# Patient Record
Sex: FEMALE | Race: WHITE | NOT HISPANIC OR LATINO | Employment: UNEMPLOYED | URBAN - METROPOLITAN AREA
[De-identification: names, ages, dates, MRNs, and addresses within clinical notes are randomized per-mention and may not be internally consistent; named-entity substitution may affect disease eponyms.]

---

## 2023-05-25 LAB — URINE CULTURE: ABNORMAL

## 2023-11-01 ENCOUNTER — APPOINTMENT (OUTPATIENT)
Dept: RADIOLOGY | Facility: CLINIC | Age: 48
End: 2023-11-01
Payer: COMMERCIAL

## 2024-01-08 ENCOUNTER — APPOINTMENT (OUTPATIENT)
Dept: OBSTETRICS AND GYNECOLOGY | Facility: CLINIC | Age: 49
End: 2024-01-08
Payer: COMMERCIAL

## 2024-01-22 ENCOUNTER — OFFICE VISIT (OUTPATIENT)
Dept: OBSTETRICS AND GYNECOLOGY | Facility: CLINIC | Age: 49
End: 2024-01-22
Payer: COMMERCIAL

## 2024-01-22 ENCOUNTER — HOSPITAL ENCOUNTER (OUTPATIENT)
Dept: RADIOLOGY | Facility: CLINIC | Age: 49
Discharge: HOME | End: 2024-01-22
Payer: COMMERCIAL

## 2024-01-22 DIAGNOSIS — N39.46 MIXED STRESS AND URGE URINARY INCONTINENCE: Primary | ICD-10-CM

## 2024-01-22 DIAGNOSIS — N93.8 DUB (DYSFUNCTIONAL UTERINE BLEEDING): ICD-10-CM

## 2024-01-22 PROCEDURE — 58100 BIOPSY OF UTERUS LINING: CPT | Performed by: OBSTETRICS & GYNECOLOGY

## 2024-01-22 PROCEDURE — 88305 TISSUE EXAM BY PATHOLOGIST: CPT | Performed by: PATHOLOGY

## 2024-01-22 PROCEDURE — 88305 TISSUE EXAM BY PATHOLOGIST: CPT

## 2024-01-22 PROCEDURE — 76856 US EXAM PELVIC COMPLETE: CPT | Performed by: OBSTETRICS & GYNECOLOGY

## 2024-01-22 PROCEDURE — 99214 OFFICE O/P EST MOD 30 MIN: CPT | Performed by: OBSTETRICS & GYNECOLOGY

## 2024-01-22 RX ORDER — HYDROXYUREA 500 MG/1
CAPSULE ORAL
COMMUNITY
Start: 2021-01-25 | End: 2024-01-23 | Stop reason: ALTCHOICE

## 2024-01-22 RX ORDER — IBUPROFEN 600 MG/1
TABLET ORAL EVERY 8 HOURS
COMMUNITY
Start: 2022-09-09

## 2024-01-22 RX ORDER — LEVONORGESTREL/ETHIN.ESTRADIOL 0.1-0.02MG
TABLET ORAL EVERY 24 HOURS
COMMUNITY

## 2024-01-22 NOTE — PROGRESS NOTES
Patient here complaining of irregular bleeding with continuous OCP.  Uses continuous OCP for the last 10 years due to concerns for endometriosis.  Patient is no longer having pelvic pain.  She states that her bleeding is random throughout the month.  She also complains of urine leakage with laugh, cough, and sneezing as well as random urine leakage unprovoked.  She saw a urologist 2 years ago and was started on Detrol.  It did not help with her symptoms so she Discontinued the medication.   she is ready for definitive management of her symptoms.  Her last ultrasound was in 2021 and was normal    PE   GEN NAD  Abd soft nontender nondistended  EGBUS normal  Vagina no lesions, scant blood  Cervix no lesions  Uterus 10 weeks size nontender  Adnexa no mass, nontender      Procedure: Endometrial Biopsy    Patient Declined chaperone   Procedure indication: Abnormal uterine bleeding    Risks, benefits and alternatives were discussed with the patient. We discussed possible complications and risks. Written consent was obtained prior to the procedure and is detailed in the patient's record.    Pregnancy Test: Pregnancy test not indicated    Local anesthesia: No  Tenaculum applied: yes  Cervical dilation: No  The endometrial biopsy was completed with an adequate sample obtained .  Uterus sounded to 9cm    The patient tolerated the procedure well and had minimal bleeding noted at the conclusion of the procedure.    Sample sent to  Pathology    Pelvic US today normal  Will refer to urogyn for further evaluation  Tonja Vann  01/22/2024  11:01 AM

## 2024-01-26 LAB
LABORATORY COMMENT REPORT: NORMAL
PATH REPORT.FINAL DX SPEC: NORMAL
PATH REPORT.GROSS SPEC: NORMAL
PATH REPORT.RELEVANT HX SPEC: NORMAL
PATH REPORT.TOTAL CANCER: NORMAL

## 2024-01-30 ENCOUNTER — DOCUMENTATION (OUTPATIENT)
Dept: OBSTETRICS AND GYNECOLOGY | Facility: CLINIC | Age: 49
End: 2024-01-30
Payer: COMMERCIAL

## 2024-01-30 ENCOUNTER — HOSPITAL ENCOUNTER (OUTPATIENT)
Dept: RADIOLOGY | Facility: CLINIC | Age: 49
Discharge: HOME | End: 2024-01-30
Payer: COMMERCIAL

## 2024-01-30 VITALS — WEIGHT: 171.08 LBS | HEIGHT: 63 IN | BODY MASS INDEX: 30.31 KG/M2

## 2024-01-30 DIAGNOSIS — Z12.31 ENCOUNTER FOR SCREENING MAMMOGRAM FOR MALIGNANT NEOPLASM OF BREAST: ICD-10-CM

## 2024-01-30 PROCEDURE — 77067 SCR MAMMO BI INCL CAD: CPT

## 2024-01-30 PROCEDURE — 77063 BREAST TOMOSYNTHESIS BI: CPT | Mod: BILATERAL PROCEDURE | Performed by: STUDENT IN AN ORGANIZED HEALTH CARE EDUCATION/TRAINING PROGRAM

## 2024-01-30 PROCEDURE — 77067 SCR MAMMO BI INCL CAD: CPT | Mod: BILATERAL PROCEDURE | Performed by: STUDENT IN AN ORGANIZED HEALTH CARE EDUCATION/TRAINING PROGRAM

## 2024-01-30 NOTE — PROGRESS NOTES
Left message for patient regarding endometrial biopsy results.  As the endometritis is chronic we typically do not treat with antibiotics but patient could consider a trial of antibiotics to see if it would help with the irregular bleeding.  Also mentioned if patient was thinking of definitive management for the bleeding as well as the urine incontinence issues she would need to see a urogynecologist not just a urologist

## 2024-02-06 ENCOUNTER — APPOINTMENT (OUTPATIENT)
Dept: UROLOGY | Facility: CLINIC | Age: 49
End: 2024-02-06
Payer: COMMERCIAL

## 2024-04-29 ENCOUNTER — OFFICE VISIT (OUTPATIENT)
Dept: OBSTETRICS AND GYNECOLOGY | Facility: CLINIC | Age: 49
End: 2024-04-29
Payer: COMMERCIAL

## 2024-04-29 VITALS
HEIGHT: 63 IN | BODY MASS INDEX: 32.43 KG/M2 | HEART RATE: 76 BPM | SYSTOLIC BLOOD PRESSURE: 136 MMHG | WEIGHT: 183 LBS | DIASTOLIC BLOOD PRESSURE: 86 MMHG

## 2024-04-29 DIAGNOSIS — N39.9 URINARY DISORDER: Primary | ICD-10-CM

## 2024-04-29 DIAGNOSIS — N32.81 OVERACTIVE BLADDER: ICD-10-CM

## 2024-04-29 DIAGNOSIS — N39.46 MIXED STRESS AND URGE URINARY INCONTINENCE: ICD-10-CM

## 2024-04-29 DIAGNOSIS — M79.18 MYOFASCIAL PAIN SYNDROME: ICD-10-CM

## 2024-04-29 DIAGNOSIS — N93.8 DUB (DYSFUNCTIONAL UTERINE BLEEDING): ICD-10-CM

## 2024-04-29 LAB
POC APPEARANCE, URINE: CLEAR
POC BILIRUBIN, URINE: NEGATIVE
POC BLOOD, URINE: NEGATIVE
POC COLOR, URINE: YELLOW
POC GLUCOSE, URINE: NEGATIVE MG/DL
POC KETONES, URINE: NEGATIVE MG/DL
POC LEUKOCYTES, URINE: ABNORMAL
POC NITRITE,URINE: NEGATIVE
POC PH, URINE: 6 PH
POC PROTEIN, URINE: NEGATIVE MG/DL
POC SPECIFIC GRAVITY, URINE: >=1.03
POC UROBILINOGEN, URINE: 0.2 EU/DL

## 2024-04-29 PROCEDURE — 99214 OFFICE O/P EST MOD 30 MIN: CPT | Performed by: OBSTETRICS & GYNECOLOGY

## 2024-04-29 PROCEDURE — 81003 URINALYSIS AUTO W/O SCOPE: CPT | Mod: QW | Performed by: OBSTETRICS & GYNECOLOGY

## 2024-04-29 PROCEDURE — 51798 US URINE CAPACITY MEASURE: CPT | Performed by: OBSTETRICS & GYNECOLOGY

## 2024-04-29 PROCEDURE — 99204 OFFICE O/P NEW MOD 45 MIN: CPT | Performed by: OBSTETRICS & GYNECOLOGY

## 2024-04-29 RX ORDER — DESOGESTREL AND ETHINYL ESTRADIOL 0.15-0.03
KIT ORAL
Qty: 84 TABLET | Refills: 3 | Status: SHIPPED | OUTPATIENT
Start: 2024-04-29

## 2024-04-29 RX ORDER — SOLIFENACIN SUCCINATE 5 MG/1
5 TABLET, FILM COATED ORAL DAILY
Qty: 30 TABLET | Refills: 4 | Status: SHIPPED | OUTPATIENT
Start: 2024-04-29 | End: 2025-04-29

## 2024-04-29 ASSESSMENT — PAIN SCALES - GENERAL: PAINLEVEL: 0-NO PAIN

## 2024-04-29 NOTE — LETTER
April 30, 2024     Tonja Vann MD  [id number] [facility]  [address]  [facility] [address]    Patient: Louise Romano   YOB: 1975   Date of Visit: 4/29/2024       Dear Dr. Tonja Vann MD:    Thank you for referring Louise Romano to me for evaluation. Below are my notes for this consultation.  If you have questions, please do not hesitate to call me. I look forward to following your patient along with you.       Sincerely,     Anne Marie Khanna MD      CC: No Recipients  ______________________________________________________________________________________    Urogynecology  Provider:  Anne Marie Khanna MD  [phone]                    ASSESSMENT AND PLAN:   47 y/o vaginally parous patient with complaints of intermittent abnormal uterine bleeding, OAB, and myofascial pelvic pain.    Diagnoses:  #1 Intermittent Abnormal Uterine Bleeding  #2 OAB  #3 Myofascial pelvic pain     Plan:  Abnormal uterine bleeding  - We recommended a higher dosage of her birth control to possibly combat the abnormal uterine bleeding. We prescribed her a 30 microgram OCP.    2. OAB  - We prescribed her Solifenacin 5 mg to treat bladder symptoms.   - Discussed etiology of OAB and potential management options.  - Reviewed bladder health recommendations (fluid intake, avoiding bladder triggers).  - Discussed OAB treatment options such as lifestyle changes, PFPT, medications, PTNS, intradetrusor Botox injections, or SNM.   - We discussed that PFPT may help with bladder/pelvic floor restrengthening exercises with an overall goal to better control the bladder and improve urinary symptoms. PFPT includes attending sessions with a specialized licensed female pelvic floor physical therapist who does external with internal vaginal work to ensure she is doing the correct exercises to obtain the most benefit of physical therapy. We reviewed the importance of continuing these home exercises to receive maximum benefit from PFPT.  They also teach mind over bladder strategies/urge suppression techniques to reduce the intensity of the urge to void.   - We discussed that with starting an OAB medication the goal would be to allow the detrusor muscle around the bladder to relax and prevent the muscles from spasm/squeezing too frequently to allow the bladder to store more urine which reduces the urge, frequency, and incontinent episodes.   - We briefly discussed third-line therapies we offer for OAB including PTNS which is an in-office weekly procedure x30 minutes of percutaneous tibial nerve stimulation procedure x12 weeks which is a time commitment, intradetrusor Botox injection in which we inject Botox to the muscle the controls the bladder to allow it to relax to provider OAB sx relief for up to 3-12 months but there is a possibility of urinary retention in which she would need to be amenable to learning how to CIC until the retention resolves over time as the effects of Botox wears off, and sacral neuromodulation where we do a PNE trial placing a temporary lead into the S3 sacral nerve that goes to the bowel/bladder in office and the patient evaluates after 5-7 days if they get 50% improvement in her urinary symptoms we would proceed with stage 2 of permanently placing the SNM device in the OR.   - Patient is amenable to pursuing medicinal treatment along with PFPT before attempting more invasive options.    3. Myofascial pelvic pain  - PFPT requisition sent today and gave her the list of local physical therapists with their corresponding locations/contact information.    Follow-up with Kelly Walsh in 2 months to check how she is doing with the medication. She will follow-up with Dr. Khanna in 4-5 months.     Scribe Attestation  By signing my name below, IJarrod Scribe, attest that this documentation has been prepared under the direction and in the presence of Anne Marie Khanna MD on 04/29/2024 at 12:40 PM.    Agree with above.  I Dr. Khanna, personally performed the services described in the documentation which was scribed virtually and confirm it is both complete and accurate.  Anne Marie Khanna MD         Problem List Items Addressed This Visit    None           I spent a total of 45 minutes in face to face and non face to face time.   Sent in consultation by Dr. Vann for DUB and UI     Anne Marie Khanna MD                    HISTORY OF PRESENT ILLNESS:   47 y/o patient presenting with OAB, pelvic pain, and abnormal uterine bleeding.      Record Review:   - The patient is sent in consultation by Dr. Babs Vann regarding abnormal uterine bleeding and urinary incontinency.   - She had an ultrasound and biopsy conducted in January - both of which came back negative. Her doctor did say she saw a polyp, but it was nothing to be concerned about.      Urinary Symptoms:   - The patient reports leaking with coughing and sneezing activity.  - She reports symptoms of incomplete bladder emptying.  - She has 2x nightly per nocturia.  - She has never had treatment for any of the previously mentioned symptoms.  - Intermittently, she gets a right-sided pelvic pain, which occurs spontaneously, and when it does, then she will have vaginal bleeding (which occurs for a short period).  - She reports both frequency and urgency related to urination.  - She does have a little laxity in the front of the vagina, which would explain the aforementioned symptoms.      Sexual Activity:   - She reports significant dyspareunia.  - She is currently sexually active with her partner of 11 years, but she is not satisfied due to significant pain with deep penetration.             Past Medical History:        Medical History        Past Medical History:   Diagnosis Date   • Personal history of malignant neoplasm, unspecified       History of malignant neoplasm   • Personal history of other diseases of the circulatory system       History of hypertension   •  Personal history of other endocrine, nutritional and metabolic disease       History of diabetes mellitus               Past Surgical History:        Surgical History         Past Surgical History:   Procedure Laterality Date   • CHOLECYSTECTOMY   09/23/2016     Cholecystectomy   • ELBOW SURGERY   09/23/2016     Elbow Surgery   • OTHER SURGICAL HISTORY   01/12/2021     Appendectomy   • TONSILLECTOMY   09/23/2016     Tonsillectomy               Medications:                Prior to Admission medications    Medication Sig Start Date End Date Taking? Authorizing Provider   ibuprofen 600 mg tablet every 8 hours. 9/9/22     Historical Provider, MD   levonorgestreL-ethinyl estrad (Larissia) 0.1-20 mg-mcg tablet once every 24 hours.       Historical Provider, MD VALDES  Review of Systems   Constitutional:  Positive for fever and unexpected weight change.        Seasonal allergies   Gastrointestinal:  Positive for nausea.   Psychiatric/Behavioral:  Positive for dysphoric mood.         Depression, nervousness            PHYSICAL EXAM:       There were no vitals taken for this visit.     No LMP recorded.        Declines chaperone for physical exam.     PVR=      Well developed, well nourished, in no apparent distress.   Neurologic/Psychiatric:  Awake, Alert and Oriented times 3.  Affect normal. Normal cranial nerves  Pulm: breathing without effort  Sexual maturity: Yo stage V  Abd exam: soft, non-tender        GENITAL/URINARY:        External Genitalia:  The patient has normal appearing external genitalia, normal skenes and bartholins glands, and a normal hair distribution.  Her vulva is without lesions, erythema or discharge.  It is non-tender with appropriate sensation.      Urethral Meatus:  Size normal, Location normal, Lesions absent, Prolapse absent,       Urethra:  Fullness absent, Masses absent,       Bladder:  Fullness absent, Masses absent, Tenderness absent,       Vagina:  General appearance normal,  Estrogen effect normal, Discharge absent, Lesions absent,       Cervix: Normal, no discharge.   Uterus:  normal size and mobile     POP-Q:  Aa: -1          Ba:  C: -8   Gh:  Pb:  TVL: 10            Ap: -3 Bp:  D: -9                    She does have myofascial tenderness on exam.   Her highest pain score on exam is 8            Anne Marie Khanna MD

## 2024-04-30 NOTE — PROGRESS NOTES
Urogynecology  Provider:  Anne Marie Khanna MD  [phone]                    ASSESSMENT AND PLAN:   49 y/o vaginally parous patient with complaints of intermittent abnormal uterine bleeding, OAB, and myofascial pelvic pain.    Diagnoses:  #1 Intermittent Abnormal Uterine Bleeding  #2 OAB  #3 Myofascial pelvic pain     Plan:  Abnormal uterine bleeding  - We recommended a higher dosage of her birth control to possibly combat the abnormal uterine bleeding. We prescribed her a 30 microgram OCP.    2. OAB  - We prescribed her Solifenacin 5 mg to treat bladder symptoms.   - Discussed etiology of OAB and potential management options.  - Reviewed bladder health recommendations (fluid intake, avoiding bladder triggers).  - Discussed OAB treatment options such as lifestyle changes, PFPT, medications, PTNS, intradetrusor Botox injections, or SNM.   - We discussed that PFPT may help with bladder/pelvic floor restrengthening exercises with an overall goal to better control the bladder and improve urinary symptoms. PFPT includes attending sessions with a specialized licensed female pelvic floor physical therapist who does external with internal vaginal work to ensure she is doing the correct exercises to obtain the most benefit of physical therapy. We reviewed the importance of continuing these home exercises to receive maximum benefit from PFPT. They also teach mind over bladder strategies/urge suppression techniques to reduce the intensity of the urge to void.   - We discussed that with starting an OAB medication the goal would be to allow the detrusor muscle around the bladder to relax and prevent the muscles from spasm/squeezing too frequently to allow the bladder to store more urine which reduces the urge, frequency, and incontinent episodes.   - We briefly discussed third-line therapies we offer for OAB including PTNS which is an in-office weekly procedure x30 minutes of percutaneous tibial nerve stimulation procedure  x12 weeks which is a time commitment, intradetrusor Botox injection in which we inject Botox to the muscle the controls the bladder to allow it to relax to provider OAB sx relief for up to 3-12 months but there is a possibility of urinary retention in which she would need to be amenable to learning how to CIC until the retention resolves over time as the effects of Botox wears off, and sacral neuromodulation where we do a PNE trial placing a temporary lead into the S3 sacral nerve that goes to the bowel/bladder in office and the patient evaluates after 5-7 days if they get 50% improvement in her urinary symptoms we would proceed with stage 2 of permanently placing the SNM device in the OR.   - Patient is amenable to pursuing medicinal treatment along with PFPT before attempting more invasive options.    3. Myofascial pelvic pain  - PFPT requisition sent today and gave her the list of local physical therapists with their corresponding locations/contact information.    Follow-up with Kelly Walsh in 2 months to check how she is doing with the medication. She will follow-up with Dr. Khanna in 4-5 months.     Scribe Attestation  By signing my name below, IJarrod Scribe, attest that this documentation has been prepared under the direction and in the presence of Anne Marie Khanna MD on 04/29/2024 at 12:40 PM.    Agree with above. I Dr. Khanna, personally performed the services described in the documentation which was scribed virtually and confirm it is both complete and accurate.  Anne Marie Khanna MD         Problem List Items Addressed This Visit    None           I spent a total of 45 minutes in face to face and non face to face time.   Sent in consultation by Dr. Vann for DUB and UI     Anne Marie Khanna MD                    HISTORY OF PRESENT ILLNESS:   47 y/o patient presenting with OAB, pelvic pain, and abnormal uterine bleeding.      Record Review:   - The patient is sent in consultation by  Dr. Babs Vann regarding abnormal uterine bleeding and urinary incontinency.   - She had an ultrasound and biopsy conducted in January - both of which came back negative. Her doctor did say she saw a polyp, but it was nothing to be concerned about.      Urinary Symptoms:   - The patient reports leaking with coughing and sneezing activity.  - She reports symptoms of incomplete bladder emptying.  - She has 2x nightly per nocturia.  - She has never had treatment for any of the previously mentioned symptoms.  - Intermittently, she gets a right-sided pelvic pain, which occurs spontaneously, and when it does, then she will have vaginal bleeding (which occurs for a short period).  - She reports both frequency and urgency related to urination.  - She does have a little laxity in the front of the vagina, which would explain the aforementioned symptoms.      Sexual Activity:   - She reports significant dyspareunia.  - She is currently sexually active with her partner of 11 years, but she is not satisfied due to significant pain with deep penetration.             Past Medical History:        Medical History        Past Medical History:   Diagnosis Date    Personal history of malignant neoplasm, unspecified       History of malignant neoplasm    Personal history of other diseases of the circulatory system       History of hypertension    Personal history of other endocrine, nutritional and metabolic disease       History of diabetes mellitus               Past Surgical History:        Surgical History         Past Surgical History:   Procedure Laterality Date    CHOLECYSTECTOMY   09/23/2016     Cholecystectomy    ELBOW SURGERY   09/23/2016     Elbow Surgery    OTHER SURGICAL HISTORY   01/12/2021     Appendectomy    TONSILLECTOMY   09/23/2016     Tonsillectomy               Medications:                Prior to Admission medications    Medication Sig Start Date End Date Taking? Authorizing Provider   ibuprofen 600 mg tablet  every 8 hours. 9/9/22     Historical Provider, MD   levonorgestreL-ethinyl estrad (Larissia) 0.1-20 mg-mcg tablet once every 24 hours.       Historical Provider, MD VALDES  Review of Systems   Constitutional:  Positive for fever and unexpected weight change.        Seasonal allergies   Gastrointestinal:  Positive for nausea.   Psychiatric/Behavioral:  Positive for dysphoric mood.         Depression, nervousness            PHYSICAL EXAM:       There were no vitals taken for this visit.     No LMP recorded.        Declines chaperone for physical exam.     PVR=      Well developed, well nourished, in no apparent distress.   Neurologic/Psychiatric:  Awake, Alert and Oriented times 3.  Affect normal. Normal cranial nerves  Pulm: breathing without effort  Sexual maturity: Yo stage V  Abd exam: soft, non-tender        GENITAL/URINARY:        External Genitalia:  The patient has normal appearing external genitalia, normal skenes and bartholins glands, and a normal hair distribution.  Her vulva is without lesions, erythema or discharge.  It is non-tender with appropriate sensation.      Urethral Meatus:  Size normal, Location normal, Lesions absent, Prolapse absent,       Urethra:  Fullness absent, Masses absent,       Bladder:  Fullness absent, Masses absent, Tenderness absent,       Vagina:  General appearance normal, Estrogen effect normal, Discharge absent, Lesions absent,       Cervix: Normal, no discharge.   Uterus:  normal size and mobile     POP-Q:  Aa: -1          Ba:  C: -8   Gh:  Pb:  TVL: 10            Ap: -3 Bp:  D: -9                    She does have myofascial tenderness on exam.   Her highest pain score on exam is 8            Anne Marie Khanna MD

## 2024-06-27 ENCOUNTER — APPOINTMENT (OUTPATIENT)
Dept: OBSTETRICS AND GYNECOLOGY | Facility: CLINIC | Age: 49
End: 2024-06-27
Payer: COMMERCIAL

## 2024-07-12 DIAGNOSIS — Z30.9 ENCOUNTER FOR CONTRACEPTIVE MANAGEMENT, UNSPECIFIED: ICD-10-CM

## 2024-07-12 RX ORDER — TIMOLOL MALEATE 5 MG/ML
SOLUTION/ DROPS OPHTHALMIC
Qty: 112 TABLET | Refills: 0 | Status: SHIPPED | OUTPATIENT
Start: 2024-07-12

## 2024-08-29 ENCOUNTER — APPOINTMENT (OUTPATIENT)
Dept: OBSTETRICS AND GYNECOLOGY | Facility: CLINIC | Age: 49
End: 2024-08-29
Payer: COMMERCIAL

## 2024-10-02 ENCOUNTER — APPOINTMENT (OUTPATIENT)
Dept: ORTHOPEDIC SURGERY | Facility: CLINIC | Age: 49
End: 2024-10-02
Payer: COMMERCIAL

## 2024-10-02 ENCOUNTER — HOSPITAL ENCOUNTER (OUTPATIENT)
Dept: RADIOLOGY | Facility: CLINIC | Age: 49
Discharge: HOME | End: 2024-10-02
Payer: COMMERCIAL

## 2024-10-02 VITALS — HEIGHT: 63 IN | BODY MASS INDEX: 31.54 KG/M2 | WEIGHT: 178 LBS

## 2024-10-02 DIAGNOSIS — M25.572 LEFT ANKLE PAIN, UNSPECIFIED CHRONICITY: ICD-10-CM

## 2024-10-02 DIAGNOSIS — M72.2 PLANTAR FASCIITIS: ICD-10-CM

## 2024-10-02 DIAGNOSIS — G57.52 TARSAL TUNNEL SYNDROME OF LEFT SIDE: Primary | ICD-10-CM

## 2024-10-02 PROCEDURE — 73610 X-RAY EXAM OF ANKLE: CPT | Mod: LEFT SIDE | Performed by: RADIOLOGY

## 2024-10-02 PROCEDURE — 1036F TOBACCO NON-USER: CPT | Performed by: ORTHOPAEDIC SURGERY

## 2024-10-02 PROCEDURE — 99204 OFFICE O/P NEW MOD 45 MIN: CPT | Performed by: ORTHOPAEDIC SURGERY

## 2024-10-02 PROCEDURE — 3008F BODY MASS INDEX DOCD: CPT | Performed by: ORTHOPAEDIC SURGERY

## 2024-10-02 PROCEDURE — 73610 X-RAY EXAM OF ANKLE: CPT | Mod: LT

## 2024-10-02 ASSESSMENT — PAIN DESCRIPTION - DESCRIPTORS: DESCRIPTORS: NUMBNESS;ACHING;SHARP

## 2024-10-02 ASSESSMENT — PAIN - FUNCTIONAL ASSESSMENT: PAIN_FUNCTIONAL_ASSESSMENT: 0-10

## 2024-10-02 ASSESSMENT — PAIN SCALES - GENERAL: PAINLEVEL_OUTOF10: 7

## 2024-10-02 NOTE — PROGRESS NOTES
"Patient was reviewed and discussed with KENJI and/or orthopedic resident.  Patient was seen and evaluated in conjunction with KENJI and/or orthopedic resident. Findings and treatment plan were discussed and approved by myself, Dr. Sparrow.    Exam: Serpiginous scar with slight keloid over medial left ankle extending towards the plantar fascia.  Intact strength and motion all 4 planes.  Dysesthesias along plantar aspect and dorsal foot.  Diffusely tender to palpation about the foot including plantar fascia and over medial ankle scar.    I personally reviewed the following radiographic exams: Left ankle shows small inferior calcaneal spur.  No arthritis.  No acute change.  MRI report from [facility] shows changes consistent with surgery to the proximal plantar fascia with possible planta fasciitis.  Mild midfoot arthritis.    Neurologist note reports EMG showing L5 radiculopathy on the left.    Assessment: Left foot and ankle pain, possible lumbar origin.  Possible scarring left tarsal tunnel status post podiatric release. ?  CRPS.    Plan: Discussed nonoperative and operative options in detail.   Risk and benefits discussed in detail. All questions answered today.  Recovery timeline and expectations discussed in detail.  Do not see anything surgical at this point.  Have ordered a neuromuscular ultrasound to see if it can be done before December at the [facility].  Have not seen the formal EMG report though does not mention tarsal tunnel on the neurologist office note.  This may be all that coming from the lumbar spine.  Do not see anything surgical to \"reconstruct her foot\".        "

## 2024-10-02 NOTE — PROGRESS NOTES
"Patient: Louise Romano Age: 49 y.o.   Gender: female     HPI  Louise Romano is a 49 y.o. female presenting for L ankle and foot pain. Patient has history of plantar fasciitis. Patient states she follows with podiatry and has a history of ankle surgeries. Past surgeries were done at outside faciltiies in 2001 for a torn tendon, likely on her plantar fascia, and most recently this past February for a \"torn tendon\" and a tarsal tunnel release. Today she endorses tingling and cold temperature of her left foot. She states her foot feels like it is asleep all of the time. She states takes motrin with little to no relief of pain. Of note, she had a recent visit with neurology where she had an EMG done showing left L5 radicular neuropathy and they recommended a neuromuscular ultrasound for her left ankle. Patient is a  and has been wearing a brace and a boot while at work. Patient stated that neurology referred her here to see if she needed reconstruction of her left foot.    ROS  See HPI      Past Medical History   Past Medical History:   Diagnosis Date    Lumbosacral disc disease     Personal history of malignant neoplasm, unspecified     History of malignant neoplasm    Personal history of other diseases of the circulatory system     History of hypertension    Personal history of other endocrine, nutritional and metabolic disease     History of diabetes mellitus      Past Surgical History:   Past Surgical History:   Procedure Laterality Date    CHOLECYSTECTOMY  09/23/2016    Cholecystectomy    ELBOW SURGERY  09/23/2016    Elbow Surgery    OTHER SURGICAL HISTORY  01/12/2021    Appendectomy    PLANTAR FASCIA RELEASE      TONSILLECTOMY  09/23/2016    Tonsillectomy    TRIGGER FINGER RELEASE         Objective:  Physical Exam  Constitutional:       General: She is not in acute distress.  Pulmonary:      Effort: Pulmonary effort is normal.   Musculoskeletal:      Comments: Surgical scar present along left medial " ankle. No structural deformities or masses seen. Full ankle ROM and strength in all 4 planes. Tenderness to palpation along surgical scar. Sensation of left foot an ankle intact.    Neurological:      Mental Status: She is alert and oriented to person, place, and time.   Psychiatric:         Mood and Affect: Mood normal.         Behavior: Behavior normal.          Assessment and Plan:  Louise Romano is a 49 y.o. female presenting for left ankle and foot pain  - Recommended neuromusculoskeltal US of left ankle  - Recommend custom brace if patient is uncomfortable wearing her current brace and boot  - No surgical intervention needed at this time.  - Follow up after [facility] US    Bonnie Poe DO  PGY1

## 2024-10-11 DIAGNOSIS — Z30.9 ENCOUNTER FOR CONTRACEPTIVE MANAGEMENT, UNSPECIFIED: ICD-10-CM

## 2024-10-11 RX ORDER — TIMOLOL MALEATE 5 MG/ML
SOLUTION/ DROPS OPHTHALMIC
Qty: 112 TABLET | Refills: 0 | Status: SHIPPED | OUTPATIENT
Start: 2024-10-11

## 2024-11-11 ENCOUNTER — APPOINTMENT (OUTPATIENT)
Dept: OBSTETRICS AND GYNECOLOGY | Facility: CLINIC | Age: 49
End: 2024-11-11
Payer: COMMERCIAL

## 2025-02-28 ENCOUNTER — APPOINTMENT (OUTPATIENT)
Dept: OBSTETRICS AND GYNECOLOGY | Facility: CLINIC | Age: 50
End: 2025-02-28
Payer: COMMERCIAL

## 2025-03-27 ENCOUNTER — OFFICE VISIT (OUTPATIENT)
Dept: ORTHOPEDIC SURGERY | Facility: CLINIC | Age: 50
End: 2025-03-27
Payer: COMMERCIAL

## 2025-03-27 VITALS — BODY MASS INDEX: 29.59 KG/M2 | WEIGHT: 167 LBS | HEIGHT: 63 IN

## 2025-03-27 DIAGNOSIS — M76.829 PTTD (POSTERIOR TIBIAL TENDON DYSFUNCTION): ICD-10-CM

## 2025-03-27 DIAGNOSIS — G57.52 TARSAL TUNNEL SYNDROME OF LEFT SIDE: Primary | ICD-10-CM

## 2025-03-27 PROCEDURE — 3008F BODY MASS INDEX DOCD: CPT | Performed by: ORTHOPAEDIC SURGERY

## 2025-03-27 PROCEDURE — 99214 OFFICE O/P EST MOD 30 MIN: CPT | Performed by: ORTHOPAEDIC SURGERY

## 2025-03-27 PROCEDURE — 1036F TOBACCO NON-USER: CPT | Performed by: ORTHOPAEDIC SURGERY

## 2025-03-27 RX ORDER — SEMAGLUTIDE 1 MG/.5ML
1 INJECTION, SOLUTION SUBCUTANEOUS
COMMUNITY
Start: 2024-07-15 | End: 2025-07-15

## 2025-03-27 RX ORDER — TIZANIDINE 2 MG/1
TABLET ORAL
COMMUNITY
Start: 2025-01-22

## 2025-03-27 ASSESSMENT — PAIN - FUNCTIONAL ASSESSMENT: PAIN_FUNCTIONAL_ASSESSMENT: 0-10

## 2025-03-27 ASSESSMENT — PAIN DESCRIPTION - DESCRIPTORS: DESCRIPTORS: NUMBNESS;TINGLING;SHOOTING

## 2025-03-27 ASSESSMENT — PAIN SCALES - GENERAL: PAINLEVEL_OUTOF10: 7

## 2025-03-27 NOTE — PROGRESS NOTES
Returns for left ankle.  Recently saw her neurologist at the [facility].  Had a recent neuromuscular ultrasound.  Has brought with her operative notes from her second podiatric procedure, arthroscopic imaging from her first podiatric procedure as well as an MRI that was done over 2 years ago after the first procedure.  Complains of numbness along the medial foot and arch.  No heel pain.    Exam: Longitudinal scar along medial hindfoot coming up to the posterior tibial crest.  Has good strength all 4 planes against resistance.  Numbness and dysesthesia along the medial arch and heel.  Nontender plantar fascia.    Arthroscopic imaging from endoscopic plantar fascia release were reviewed.  Operative note showing open tarsal tunnel release, open plantar fascial release and tenosynovectomy over the posterior tibial tendon.  Neuromuscular ultrasound shows no significant abnormal thickening of the tibial nerve though significant scarring.    Assessment: Neuritic pain status post tarsal tunnel release.  Status post plantar fascia release and posterior tibial tenosynovectomy.    Plan: Discussed nonoperative and operative options in detail.   Risk and benefits discussed in detail. All questions answered today.  Recovery timeline and expectations discussed in detail.  Does not appear to have any symptoms from her tendon or plantar fascia surgery.  Most of her complaint is regarding her nerve.  The incision is certainly more anterior than the normal tarsal tunnel release and was probably more for the posterior tibial tendon aspect of the surgery.  Have recommended a new MRI for better evaluation of the medial structures of the hindfoot before considering revision tarsal tunnel release.  Would need a more extensive exposure up in a more normal tissue proximally to follow the nerve down through its course in the tarsal tunnel.  Follow-up after MRI.

## 2025-04-03 ENCOUNTER — APPOINTMENT (OUTPATIENT)
Dept: OBSTETRICS AND GYNECOLOGY | Facility: CLINIC | Age: 50
End: 2025-04-03
Payer: COMMERCIAL

## 2025-04-03 VITALS
WEIGHT: 170 LBS | DIASTOLIC BLOOD PRESSURE: 72 MMHG | BODY MASS INDEX: 30.12 KG/M2 | HEIGHT: 63 IN | SYSTOLIC BLOOD PRESSURE: 130 MMHG

## 2025-04-03 DIAGNOSIS — Z12.31 BREAST CANCER SCREENING BY MAMMOGRAM: ICD-10-CM

## 2025-04-03 DIAGNOSIS — Z12.11 SCREEN FOR COLON CANCER: Primary | ICD-10-CM

## 2025-04-03 DIAGNOSIS — Z01.419 WELL WOMAN EXAM: ICD-10-CM

## 2025-04-03 PROCEDURE — 88175 CYTOPATH C/V AUTO FLUID REDO: CPT

## 2025-04-03 PROCEDURE — 99396 PREV VISIT EST AGE 40-64: CPT | Performed by: OBSTETRICS & GYNECOLOGY

## 2025-04-03 PROCEDURE — 3008F BODY MASS INDEX DOCD: CPT | Performed by: OBSTETRICS & GYNECOLOGY

## 2025-04-03 NOTE — PROGRESS NOTES
"Louise Romano is a 49 y.o. female who is here for a routine exam. PCP = Huma Baeza, DO  Daily bleeding on OCP - taking continuously  B pelvic pain    Menses : Daily bleeding with continuous OCP  Contraception : OCP  HPV vaccine : No  Last pap : 2023 normal  Last HPV : 2023 negative  History of abnormal pap : No  Last mammogram : 2024 normal  History of abnormal mammogram : No  Colon cancer screen : Never    ROS  systems reviewed, anything negative noted in HPI    bladder: no dysuria, gross hematuria, urinary frequency, urgency or incontinence  breast: no lumps, nipple d/c, overlying skin changes, redness, or skin retraction    [unfilled]    Past med hx and past surg hx reviewed and notable for: History of endometriosis    Objective   /72 (BP Location: Left arm, Patient Position: Sitting)   Ht 1.6 m (5' 3\")   Wt 77.1 kg (170 lb)   BMI 30.11 kg/m²      General:   Alert and oriented, in no acute distress   Neck: Supple. No visible thyromegaly.    Breast/Axilla: Normal to palpation bilaterally without masses, skin changes, lymphadenopathy, or nipple discharge.    Abdomen: Soft, non-tender, without masses or organomegaly   Vulva:  Urethra: Normal architecture without erythema, masses, or lesions.   Normal    Vagina: Normal mucosa without lesions, masses, or atrophy. No abnormal vaginal discharge.    Cervix: Normal without masses, lesions, or signs of cervicitis. Friable on exam   Uterus: Normal mobile, non-enlarged uterus    Adnexa: Normal without masses or lesions   Pelvic Floor: No POP noted.    Psych:  Anus/Perineum: Normal affect. Normal mood.   Normal without masses or lesions      Thank you for coming to your annual exam. Your findings during the exam were normal. Specific topics addressed during this exam included: healthy lifestyle, well woman screening guidelines,     Actions performed during this visit include:  - Clinical breast exam  - Clinical pelvic exam  - pap  - rec discontinue oral " contraceptive pill  - Patient will message me if still bleeding in 3 months  - cervix friable on exam.  May be due to oral contraceptive pill.  If continued bleeding may need silver nitrate  - may check FSH in 3 mo  - colonoscopy ordered  Orders Placed This Encounter   Procedures    BI mammo bilateral screening tomosynthesis           Please return for your next visit in 1 year.

## 2025-04-10 ENCOUNTER — HOSPITAL ENCOUNTER (OUTPATIENT)
Dept: RADIOLOGY | Facility: CLINIC | Age: 50
Discharge: HOME | End: 2025-04-10
Payer: COMMERCIAL

## 2025-04-10 DIAGNOSIS — Z12.31 BREAST CANCER SCREENING BY MAMMOGRAM: ICD-10-CM

## 2025-04-10 PROCEDURE — 77063 BREAST TOMOSYNTHESIS BI: CPT

## 2025-04-10 PROCEDURE — 77067 SCR MAMMO BI INCL CAD: CPT | Performed by: RADIOLOGY

## 2025-04-10 PROCEDURE — 77063 BREAST TOMOSYNTHESIS BI: CPT | Performed by: RADIOLOGY

## 2025-04-11 ENCOUNTER — APPOINTMENT (OUTPATIENT)
Dept: RADIOLOGY | Facility: CLINIC | Age: 50
End: 2025-04-11
Payer: COMMERCIAL

## 2025-04-17 ENCOUNTER — APPOINTMENT (OUTPATIENT)
Dept: ORTHOPEDIC SURGERY | Facility: CLINIC | Age: 50
End: 2025-04-17
Payer: COMMERCIAL

## 2025-04-17 LAB
CYTOLOGY CMNT CVX/VAG CYTO-IMP: NORMAL
LAB AP HPV GENOTYPE QUESTION: YES
LAB AP HPV HR: NORMAL
LABORATORY COMMENT REPORT: NORMAL
PATH REPORT.TOTAL CANCER: NORMAL

## 2025-04-24 ENCOUNTER — APPOINTMENT (OUTPATIENT)
Dept: RADIOLOGY | Facility: CLINIC | Age: 50
End: 2025-04-24
Payer: COMMERCIAL

## 2025-05-01 ENCOUNTER — APPOINTMENT (OUTPATIENT)
Dept: ORTHOPEDIC SURGERY | Facility: CLINIC | Age: 50
End: 2025-05-01
Payer: COMMERCIAL

## 2025-05-15 ENCOUNTER — APPOINTMENT (OUTPATIENT)
Dept: RADIOLOGY | Facility: CLINIC | Age: 50
End: 2025-05-15
Payer: COMMERCIAL

## 2025-05-15 DIAGNOSIS — M76.829 PTTD (POSTERIOR TIBIAL TENDON DYSFUNCTION): ICD-10-CM

## 2025-05-15 DIAGNOSIS — G57.52 TARSAL TUNNEL SYNDROME OF LEFT SIDE: ICD-10-CM

## 2025-05-15 PROCEDURE — 73721 MRI JNT OF LWR EXTRE W/O DYE: CPT | Mod: LT

## 2025-05-29 ENCOUNTER — APPOINTMENT (OUTPATIENT)
Dept: ORTHOPEDIC SURGERY | Facility: CLINIC | Age: 50
End: 2025-05-29
Payer: COMMERCIAL

## 2025-05-29 DIAGNOSIS — G57.52 TARSAL TUNNEL SYNDROME OF LEFT SIDE: Primary | ICD-10-CM

## 2025-05-29 PROCEDURE — 1036F TOBACCO NON-USER: CPT | Performed by: ORTHOPAEDIC SURGERY

## 2025-05-29 PROCEDURE — 99213 OFFICE O/P EST LOW 20 MIN: CPT | Performed by: ORTHOPAEDIC SURGERY

## 2025-05-29 NOTE — PROGRESS NOTES
Returns for MRI of left ankle.    Exam: Unchanged.    I personally reviewed the following radiographic exams: MRI shows no mass effect around the tarsal tunnel.  Some fluid around the FDL tendon.  Postsurgical change of plantar fascia.    Assessment: Neuropathic pain left foot status post podiatric tarsal tunnel release.  Possible L5 radiculopathy with left foot pain.    Plan: Discussed nonoperative and operative options in detail.   Risk and benefits discussed in detail. All questions answered today.  Recovery timeline and expectations discussed in detail.  Her neurologist is planning therapy and possible injection in the back to see if that is the source of her left foot pain and numbness.  We discussed possible revision tarsal tunnel release with no guarantees given.  I think it is reasonable to try the therapy and the injections to see if that helps.  Discussed the findings of the MRI, EMG and neuromuscular ultrasound in the setting of previous surgery.  She can follow-up after doing her therapy and back injections if she still wants to consider surgical options.

## 2025-06-07 DIAGNOSIS — B37.31 YEAST VAGINITIS: Primary | ICD-10-CM

## 2025-06-07 RX ORDER — FLUCONAZOLE 150 MG/1
150 TABLET ORAL ONCE
Qty: 2 TABLET | Refills: 0 | Status: SHIPPED | OUTPATIENT
Start: 2025-06-07 | End: 2025-06-07